# Patient Record
Sex: MALE | Race: BLACK OR AFRICAN AMERICAN | NOT HISPANIC OR LATINO | Employment: FULL TIME | URBAN - METROPOLITAN AREA
[De-identification: names, ages, dates, MRNs, and addresses within clinical notes are randomized per-mention and may not be internally consistent; named-entity substitution may affect disease eponyms.]

---

## 2017-08-09 ENCOUNTER — HOSPITAL ENCOUNTER (EMERGENCY)
Facility: HOSPITAL | Age: 32
Discharge: HOME | End: 2017-08-09
Attending: EMERGENCY MEDICINE
Payer: COMMERCIAL

## 2017-08-09 VITALS
TEMPERATURE: 98 F | HEART RATE: 58 BPM | DIASTOLIC BLOOD PRESSURE: 62 MMHG | WEIGHT: 180 LBS | HEIGHT: 72 IN | OXYGEN SATURATION: 98 % | BODY MASS INDEX: 24.38 KG/M2 | RESPIRATION RATE: 18 BRPM | SYSTOLIC BLOOD PRESSURE: 126 MMHG

## 2017-08-09 DIAGNOSIS — R10.9 ABDOMINAL PAIN: ICD-10-CM

## 2017-08-09 DIAGNOSIS — R10.9 ABDOMINAL PAIN, UNSPECIFIED LOCATION: Primary | ICD-10-CM

## 2017-08-09 DIAGNOSIS — K59.00 CONSTIPATION, UNSPECIFIED CONSTIPATION TYPE: ICD-10-CM

## 2017-08-09 PROCEDURE — 25000003 PHARM REV CODE 250: Performed by: EMERGENCY MEDICINE

## 2017-08-09 PROCEDURE — 99284 EMERGENCY DEPT VISIT MOD MDM: CPT

## 2017-08-09 RX ORDER — LACTULOSE 10 G/15ML
10 SOLUTION ORAL 2 TIMES DAILY PRN
Qty: 150 ML | Refills: 0 | Status: SHIPPED | OUTPATIENT
Start: 2017-08-09 | End: 2022-12-05

## 2017-08-09 RX ORDER — ONDANSETRON 4 MG/1
4 TABLET, FILM COATED ORAL EVERY 6 HOURS PRN
Qty: 10 TABLET | Refills: 0 | Status: SHIPPED | OUTPATIENT
Start: 2017-08-09 | End: 2020-02-14

## 2017-08-09 RX ORDER — DICYCLOMINE HYDROCHLORIDE 20 MG/1
20 TABLET ORAL 3 TIMES DAILY PRN
Qty: 14 TABLET | Refills: 0 | Status: SHIPPED | OUTPATIENT
Start: 2017-08-09 | End: 2017-09-08

## 2017-08-09 RX ADMIN — LIDOCAINE HYDROCHLORIDE: 20 SOLUTION ORAL; TOPICAL at 09:08

## 2017-08-10 NOTE — ED PROVIDER NOTES
"Encounter Date: 8/9/2017       History     Chief Complaint   Patient presents with    Abdominal Pain     present X 1.5 weeks. reports feels like constipation. reports has been taking OTC medications with no relief. reports it is "uncomfortable more than pain".      32-year-old male presents the emergency department complaining of abdominal pain.  Patient reports a generalized cramping abdominal pain that comes and goes.  Reports onset at least one or 2 weeks ago.  Reports that fluctuates in intensity.  No exacerbating or alleviating factors noted.  Patient reports it is been worsening over the last few days.  Patient reports he is also felt constipated with some occasional gas and nausea.  No vomiting.  Reports he has tried over-the-counter remedies such as stool softeners, laxatives, magnesium citrate.  He occasionally has some loose stool but feels he still has not had a good bowel movement.  No other symptoms reported.  Denies any headache, lightheadedness, dizziness, sore throat, chest pain, shorts of breath, dysuria, hematuria, fever, chills.      Review of patient's allergies indicates:  No Known Allergies  History reviewed. No pertinent past medical history.  History reviewed. No pertinent surgical history.  History reviewed. No pertinent family history.  Social History   Substance Use Topics    Smoking status: Never Smoker    Smokeless tobacco: Never Used    Alcohol use Yes      Comment: social     Review of Systems   Constitutional: Negative for chills, fatigue and fever.   HENT: Negative for congestion, sore throat and voice change.    Eyes: Negative for photophobia, pain and redness.   Respiratory: Negative for cough, choking and shortness of breath.    Cardiovascular: Negative for chest pain, palpitations and leg swelling.   Gastrointestinal: Positive for abdominal pain and nausea. Negative for vomiting.   Genitourinary: Negative for dysuria, frequency and urgency.   Musculoskeletal: Negative for " back pain, neck pain and neck stiffness.   Neurological: Negative for seizures, speech difficulty, light-headedness, numbness and headaches.   All other systems reviewed and are negative.      Physical Exam     Initial Vitals [08/09/17 2058]   BP Pulse Resp Temp SpO2   126/62 (!) 58 18 98.4 °F (36.9 °C) 98 %      MAP       83.33         Physical Exam    Nursing note and vitals reviewed.  Constitutional: He appears well-developed and well-nourished. No distress.   HENT:   Head: Normocephalic and atraumatic.   Oropharynx clear; dry mucous membranes   Eyes: Conjunctivae and EOM are normal. Pupils are equal, round, and reactive to light.   Neck: Normal range of motion. Neck supple. No tracheal deviation present.   Cardiovascular: Normal rate, regular rhythm, normal heart sounds and intact distal pulses.   Pulmonary/Chest: Breath sounds normal. No respiratory distress. He has no wheezes. He has no rhonchi. He has no rales.   Abdominal: Soft. Bowel sounds are normal. He exhibits no distension. There is tenderness (Minimal diffuse tenderness). There is no rebound and no guarding.   Musculoskeletal: Normal range of motion. He exhibits no edema or tenderness.   Neurological: He is alert and oriented to person, place, and time. He has normal strength. No cranial nerve deficit or sensory deficit.   Skin: Skin is warm and dry. Capillary refill takes less than 2 seconds.         ED Course   Procedures  Labs Reviewed - No data to display       X-Rays:   Independently Interpreted Readings:   Abdomen: XR Abd Flat & Erect interpreted by radiologist and visualized by me:      Imaging Results          X-Ray Abdomen Flat And Erect (Final result)  Result time 08/09/17 21:53:20    Final result by Lorenzo Hickman MD (08/09/17 21:53:20)                 Impression:         Nonobstructive bowel gas pattern.      Electronically signed by: LORENZO HICKMAN MD, MD  Date:     08/09/17  Time:    21:53              Narrative:    Comparison:  None.    Findings: Upright and supine views of the abdomen.  Nonspecific bowel gas pattern without evidence of bowel obstruction.  No small bowel air-fluid levels.  No free air.   No organomegaly or mass effect.  No abnormal intra-abdominal calcification seen.  Osseous structures appear intact.  Visualized lung bases are clear.                              Medical Decision Making:   Initial Assessment:   32-year-old male presents the emergency department complaining of abdominal pain, constipation  Differential Diagnosis:   Constipation, obstruction, ileus  Independently Interpreted Test(s):   I have ordered and independently interpreted X-rays - see prior notes.  ED Management:  Patient given GI cocktail.  He is tolerating by mouth with stable vital signs.  We discussed disposition including discharge with instructions on home management, instructions to increase his oral hydration, prefers for Zofran, Bentyl, lactulose, instructions to follow-up with a primary care physician, return with new or worsening symptoms.  Patient expressed good understanding and is comfortable with discharge at this time.                   ED Course     Clinical Impression:   The primary encounter diagnosis was Abdominal pain, unspecified location. Diagnoses of Abdominal pain and Constipation, unspecified constipation type were also pertinent to this visit.    Disposition:   Disposition: Discharged  Condition: Stable                        Christian Worthington MD  08/09/17 [id number]

## 2017-08-10 NOTE — ED TRIAGE NOTES
31 Y/O M with CC of abdominal discomfort x1.5 weeks. Pt states has been having a bubbling sensation. Has been having diarrhea and has been taking laxatives feeling as though has not finished. Reports blood when wiping. No other complaints verbalized. NAD noted. Will continue to monitor.

## 2019-09-07 ENCOUNTER — OFFICE VISIT (OUTPATIENT)
Dept: URGENT CARE | Facility: CLINIC | Age: 34
End: 2019-09-07

## 2019-09-07 VITALS
HEIGHT: 72 IN | DIASTOLIC BLOOD PRESSURE: 65 MMHG | OXYGEN SATURATION: 100 % | SYSTOLIC BLOOD PRESSURE: 145 MMHG | WEIGHT: 192 LBS | TEMPERATURE: 98 F | RESPIRATION RATE: 18 BRPM | BODY MASS INDEX: 26.01 KG/M2 | HEART RATE: 60 BPM

## 2019-09-07 DIAGNOSIS — K60.2 ANAL FISSURE: Primary | ICD-10-CM

## 2019-09-07 DIAGNOSIS — Z76.89 ENCOUNTER TO ESTABLISH CARE: ICD-10-CM

## 2019-09-07 DIAGNOSIS — K58.9 IRRITABLE BOWEL SYNDROME, UNSPECIFIED TYPE: ICD-10-CM

## 2019-09-07 DIAGNOSIS — F41.9 ANXIETY: ICD-10-CM

## 2019-09-07 PROCEDURE — 3008F PR BODY MASS INDEX (BMI) DOCUMENTED: ICD-10-PCS | Mod: CPTII,S$GLB,, | Performed by: PHYSICIAN ASSISTANT

## 2019-09-07 PROCEDURE — 99203 OFFICE O/P NEW LOW 30 MIN: CPT | Mod: S$GLB,,, | Performed by: PHYSICIAN ASSISTANT

## 2019-09-07 PROCEDURE — 3008F BODY MASS INDEX DOCD: CPT | Mod: CPTII,S$GLB,, | Performed by: PHYSICIAN ASSISTANT

## 2019-09-07 PROCEDURE — 99203 PR OFFICE/OUTPT VISIT, NEW, LEVL III, 30-44 MIN: ICD-10-PCS | Mod: S$GLB,,, | Performed by: PHYSICIAN ASSISTANT

## 2019-09-07 RX ORDER — HYDROXYZINE HYDROCHLORIDE 25 MG/1
25 TABLET, FILM COATED ORAL 3 TIMES DAILY
Qty: 30 TABLET | Refills: 0 | Status: SHIPPED | OUTPATIENT
Start: 2019-09-07 | End: 2022-12-05

## 2019-09-07 RX ORDER — LIDOCAINE HYDROCHLORIDE 20 MG/ML
JELLY TOPICAL 3 TIMES DAILY PRN
Qty: 30 ML | Refills: 0 | Status: SHIPPED | OUTPATIENT
Start: 2019-09-07 | End: 2020-02-14

## 2019-09-07 RX ORDER — HYDROCORTISONE 25 MG/G
CREAM TOPICAL 2 TIMES DAILY
Qty: 28 G | Refills: 0 | Status: SHIPPED | OUTPATIENT
Start: 2019-09-07 | End: 2022-12-05

## 2019-09-07 RX ORDER — DOCUSATE SODIUM 100 MG/1
100 CAPSULE, LIQUID FILLED ORAL 2 TIMES DAILY
Qty: 30 CAPSULE | Refills: 0 | Status: SHIPPED | OUTPATIENT
Start: 2019-09-07 | End: 2022-12-05

## 2019-09-07 NOTE — PATIENT INSTRUCTIONS
Diet and Lifestyle Tips for Irritable Bowel Syndrome (IBS)    Your healthcare professional may suggest some lifestyle changes to help control your IBS. Changing your diet and managing stress are two of the most important. Follow your healthcare providers instructions and try some of the suggestions below.  Change your diet  Your diet may be an important cause of IBS symptoms. You may want to try the following:  · Pay attention to what foods bother you, and avoid them. For example, dairy products are hard for some people to digest.  · Drink 6 to 8 glasses of water a day.  · Avoid caffeine and tobacco. These are muscle stimulants and can affect the working of your digestive tract.  · Avoid alcohol, which can irritate your digestive tract and make your symptoms worse.  · Eat more fiber if constipation is a problem. Fiber makes the stool softer and easier to pass through the colon.  Reduce stress  If stress or anxiety makes your IBS symptoms worse, learning how to manage stress may help you feel better. Try these tips:  · Identify the causes of stress in your life.  · Learn new ways to cope with them.  · Regular exercise is a great way to relieve stress. It can also help ease constipation.  Date Last Reviewed: 7/1/2016  © 1969-9650 [employer]. [address] [place] [address]. All rights reserved. This information is not intended as a substitute for professional medical care. Always follow your healthcare professional's instructions.        Taking a Sitz Bath  A sitz bath is a type of therapy done by sitting in warm, shallow water. It can help soothe pain, itching, and other symptoms in the anal and genital areas. It can also help keep these areas clean if you cant take a bath or shower. Sitz is from the [place] word sitzen, which means to sit.  Why a sitz bath is done  A sitz bath helps clean and treat certain problems in the anal area, genital area, and the perineum. The perineum is the area  between the anus and the vulva in women. In men, it is between the anus and the scrotum. A sitz bath helps increase blood flow to these areas and relax the muscles.  A sitz bath may be done to:  · Help ease pain and itching from hemorrhoids  · Help ease pain from an anal fissure  · Bathe and soothe the perineum after childbirth  · Clean and soothe the anal area or perineum after surgery  · Ease prostate pain during prostatitis or after a procedure  · Help ease period cramps  · Clean the anal and genital areas if you cant take a bath or shower  How a sitz bath is done  A sitz bath can be done in 2 ways: in a bathtub or using a sitz bath bowl.  In a bathtub  To take a sitz bath in a tub:  · Make sure your bathtub is clean. Fill a clean bathtub with 3 to 4 inches of warm water. In some cases, your healthcare provider may tell you to use cold water instead.  · Add salt or medicine to the water if advised by your healthcare provider.  · Gently lower yourself down into the bathtub and sit on the bottom of the tub. Dont get into the bath unless the water temperature is comfortable.  · Hold on to a railing. Or ask for help from a family member, friend, or caregiver if needed.  If you have a wound, the water may cause pain at first. But the pain should ease. Make sure the area that needs treating is under the water. You can bend your knees up to help expose the area that needs contact with the water.  Using a sitz bath bowl  A sitz bath bowl is a special plastic container thats placed on a toilet. You can buy this in many drugstores and medical supply stores. To take a sitz bath this way:  · Lift the toilet lid and seat. Place a cleaned plastic sitz bath bowl on the rim of your toilet. Make sure the bowl is firmly in place and wont move around.  · Fill the sitz bath bowl with warm water from a pitcher or other container. The water should cover your perineum. Make sure the water temperature is comfortable.  · Add salt or  medicine to the water if advised by your healthcare provider. Or follow the package instructions about how to fill the bowl. Some kits come with a plastic bag and tubing. This lets you stream water into the bowl and at the area of your body that needs treatment. The bowl may have a slot or hole in the back. This lets water flow out so it doesnt overflow onto the floor. If there is no hole, be careful not to fill the bowl too full.  · Gently sit down on the sitz bath bowl. Hold on to a railing. Or ask for help from a family member, friend, or caregiver if needed.  If you have a wound, the water may cause pain at first, but the pain should ease. Make sure the area that needs treating is under the water.  For any type of sitz bath:  1. Sit in the water for 10 to 20 minutes.  2. Add more warm water as needed to keep the water comfortable.  3. Get up slowly from the tub or toilet. You may feel lightheaded or dizzy. Hold on to a railing. Or ask for help from a family member, friend, or caregiver if needed.  4. Gently pat your anal area, perineum, and genitals dry with a clean towel. Dont rub the area.  5. Wash your hands. Put any ointment or cream on the area, if advised.  6. Wash the bathtub or sitz bath bowl with soap and water after each use.  7. Use a sitz bath 2 to 3 times a day, or as often as your healthcare provider advises.  When to call your healthcare provider  Call your healthcare provider right away if you have any of these:  · Fever of 100.4°F (38°C) or higher, or as directed  · Redness, swelling, or fluid leaking from a cut (incision) that gets worse  · Pain that gets worse  · Symptoms that dont get better, or get worse  · New symptoms   Date Last Reviewed: 5/1/2016  © 6356-3870 [employer]. [address] [place] [address], [place] [address]. All rights reserved. This information is not intended as a substitute for professional medical care. Always follow your healthcare professional's  instructions.        Anxiety Reaction  Anxiety is the feeling we all get when we think something bad might happen. It is a normal response to stress and usually causes only a mild reaction. When anxiety becomes more severe, it can interfere with daily life. In some cases, you may not even be aware of what it is youre anxious about. There may also be a genetic link or it may be a learned behavior in the home.  Both psychological and physical triggers cause stress reaction. It's often a response to fear or emotional stress, real or imagined. This stress may come from home, family, work, or social relationships.  During an anxiety reaction, you may feel:  · Helpless  · Nervous  · Depressed  · Irritable  Your body may show signs of anxiety in many ways. You may experience:  · Dry mouth  · Shakiness  · Dizziness  · Weakness  · Trouble breathing  · Breathing fast (hyperventilating)  · Chest pressure  · Sweating  · Headache  · Nausea  · Diarrhea  · Tiredness  · Inability to sleep  · Sexual problems  Home care  · Try to locate the sources of stress in your life. They may not be obvious. These may include:  ¨ Daily hassles of life (traffic jams, missed appointments, car troubles, etc.)  ¨ Major life changes, both good (new baby, job promotion) and bad (loss of job, loss of loved one)  ¨ Overload: feeling that you have too many responsibilities and can't take care of all of them at once  ¨ Feeling helpless, feeling that your problems are beyond what youre able to solve  · Notice how your body reacts to stress. Learn to listen to your body signals. This will help you take action before the stress becomes severe.  · When you can, do something about the source of your stress. (Avoid hassles, limit the amount of change that happens in your life at one time and take a break when you feel overloaded).  · Unfortunately, many stressful situations can't be avoided. It is necessary to learn how to better manage stress. There are many  proven methods that will reduce your anxiety. These include simple things like exercise, good nutrition and adequate rest. Also, there are certain techniques that are helpful:  ¨ Relaxation  ¨ Breathing exercises  ¨ Visualization  ¨ Biofeedback  ¨ Meditation  For more information about this, consult your doctor or go to a local bookstore and review the many books and tapes available on this subject.  Follow-up care  If you feel that your anxiety is not responding to self-help measures, contact your doctor or make an appointment with a counselor. You may need short-term psychological counseling and temporary medicine to help you manage stress.  Call 911  Call your healthcare provider right away if any of these occur:  · Trouble breathing  · Confusion  · Drowsiness or trouble wakening  · Fainting or loss of consciousness  · Rapid heart rate  · Seizure  · New chest pain that becomes more severe, lasts longer, or spreads into your shoulder, arm, neck, jaw, or back  When to seek medical advice  Call your healthcare provider right away if any of these occur:  · Your symptoms get worse  · Severe headache not relieved by rest and mild pain reliever  Date Last Reviewed: 9/29/2015  © 5788-6358 [employer]. [address] [place] [address]. All rights reserved. This information is not intended as a substitute for professional medical care. Always follow your healthcare professional's instructions.      Please follow up with your Primary care provider within 2-5 days if your signs and symptoms have not resolved or worsen.     If your condition worsens or fails to improve we recommend that you receive another evaluation at the emergency room immediately or contact your primary medical clinic to discuss your concerns.   You must understand that you have received an Urgent Care treatment only and that you may be released before all of your medical problems are known or treated. You, the patient, will arrange  for follow up care as instructed.     RED FLAGS/WARNING SYMPTOMS DISCUSSED WITH PATIENT THAT WOULD WARRANT EMERGENT MEDICAL ATTENTION. PATIENT VERBALIZED UNDERSTANDING.

## 2019-09-07 NOTE — PROGRESS NOTES
Subjective:       Patient ID: Susan Allison is a 34 y.o. male.    Vitals:  height is 6' (1.829 m) and weight is 87.1 kg (192 lb). His temperature is 97.7 °F (36.5 °C). His blood pressure is 145/65 (abnormal) and his pulse is 60. His respiration is 18 and oxygen saturation is 100%.     Chief Complaint: Rectal Bleeding    Rectal Bleeding   This is a new problem. The current episode started in the past 7 days (2 month ). The problem has been gradually worsening. Associated symptoms include abdominal pain. Pertinent negatives include no chest pain, chills, diaphoresis, fever, nausea or vomiting. Exacerbated by: bowel movement, passing gas. He has tried nothing for the symptoms.       Constitution: Negative for appetite change, chills, sweating and fever.   Cardiovascular: Negative for chest pain.   Respiratory: Negative for shortness of breath.    Gastrointestinal: Positive for abdominal pain, constipation, diarrhea, bright red blood in stool, rectal bleeding and rectal pain. Negative for abdominal trauma, abdominal bloating, history of abdominal surgery, nausea, vomiting, dark colored stools and heartburn.   Genitourinary: Negative for dysuria and pelvic pain.   Musculoskeletal: Negative for back pain.       Objective:      Physical Exam   Constitutional: He is oriented to person, place, and time. He appears well-developed and well-nourished.   HENT:   Head: Normocephalic and atraumatic.   Right Ear: External ear normal.   Left Ear: External ear normal.   Nose: Nose normal.   Mouth/Throat: Mucous membranes are normal.   Eyes: Conjunctivae and lids are normal.   Neck: Trachea normal and full passive range of motion without pain. Neck supple.   Cardiovascular: Normal rate, regular rhythm and normal heart sounds.   Pulmonary/Chest: Effort normal and breath sounds normal. No respiratory distress.   Abdominal: Soft. Normal appearance and bowel sounds are normal. He exhibits no distension, no abdominal bruit, no pulsatile  midline mass and no mass. There is no tenderness.   Genitourinary: Prostate normal. Rectal exam shows fissure and tenderness. Rectal exam shows no external hemorrhoid, no internal hemorrhoid, no mass and anal tone normal.   Musculoskeletal: Normal range of motion. He exhibits no edema.   Neurological: He is alert and oriented to person, place, and time. He has normal strength.   Skin: Skin is warm, dry and intact. He is not diaphoretic. No pallor.   Psychiatric: He has a normal mood and affect. His speech is normal and behavior is normal. Judgment and thought content normal. Cognition and memory are normal.   Nursing note and vitals reviewed.      Assessment:       1. Anal fissure    2. Irritable bowel syndrome, unspecified type    3. Anxiety    4. Encounter to establish care        Plan:         Anal fissure  -     Ambulatory referral to Internal Medicine  -     Ambulatory referral to Gastroenterology  -     hydrocortisone 2.5 % cream; Apply topically 2 (two) times daily.  Dispense: 28 g; Refill: 0  -     lidocaine HCL 2% (XYLOCAINE) 2 % jelly; Apply topically 3 (three) times daily as needed.  Dispense: 30 mL; Refill: 0    Irritable bowel syndrome, unspecified type  -     Ambulatory referral to Internal Medicine  -     Ambulatory referral to Gastroenterology    Anxiety  -     Ambulatory referral to Internal Medicine  -     hydrOXYzine HCl (ATARAX) 25 MG tablet; Take 1 tablet (25 mg total) by mouth 3 (three) times daily.  Dispense: 30 tablet; Refill: 0    Encounter to establish care  -     Ambulatory referral to Internal Medicine  -     Ambulatory referral to Gastroenterology    Other orders  -     docusate sodium (COLACE) 100 MG capsule; Take 1 capsule (100 mg total) by mouth 2 (two) times daily.  Dispense: 30 capsule; Refill: 0          Diet and Lifestyle Tips for Irritable Bowel Syndrome (IBS)    Your healthcare professional may suggest some lifestyle changes to help control your IBS. Changing your diet and  managing stress are two of the most important. Follow your healthcare providers instructions and try some of the suggestions below.  Change your diet  Your diet may be an important cause of IBS symptoms. You may want to try the following:  · Pay attention to what foods bother you, and avoid them. For example, dairy products are hard for some people to digest.  · Drink 6 to 8 glasses of water a day.  · Avoid caffeine and tobacco. These are muscle stimulants and can affect the working of your digestive tract.  · Avoid alcohol, which can irritate your digestive tract and make your symptoms worse.  · Eat more fiber if constipation is a problem. Fiber makes the stool softer and easier to pass through the colon.  Reduce stress  If stress or anxiety makes your IBS symptoms worse, learning how to manage stress may help you feel better. Try these tips:  · Identify the causes of stress in your life.  · Learn new ways to cope with them.  · Regular exercise is a great way to relieve stress. It can also help ease constipation.  Date Last Reviewed: 7/1/2016  © 4104-1280 [employer] [address] [place] [address]. All rights reserved. This information is not intended as a substitute for professional medical care. Always follow your healthcare professional's instructions.        Taking a Sitz Bath  A sitz bath is a type of therapy done by sitting in warm, shallow water. It can help soothe pain, itching, and other symptoms in the anal and genital areas. It can also help keep these areas clean if you cant take a bath or shower. Sitz is from the [place] word sitzen, which means to sit.  Why a sitz bath is done  A sitz bath helps clean and treat certain problems in the anal area, genital area, and the perineum. The perineum is the area between the anus and the vulva in women. In men, it is between the anus and the scrotum. A sitz bath helps increase blood flow to these areas and relax the muscles.  A sitz  bath may be done to:  · Help ease pain and itching from hemorrhoids  · Help ease pain from an anal fissure  · Bathe and soothe the perineum after childbirth  · Clean and soothe the anal area or perineum after surgery  · Ease prostate pain during prostatitis or after a procedure  · Help ease period cramps  · Clean the anal and genital areas if you cant take a bath or shower  How a sitz bath is done  A sitz bath can be done in 2 ways: in a bathtub or using a sitz bath bowl.  In a bathtub  To take a sitz bath in a tub:  · Make sure your bathtub is clean. Fill a clean bathtub with 3 to 4 inches of warm water. In some cases, your healthcare provider may tell you to use cold water instead.  · Add salt or medicine to the water if advised by your healthcare provider.  · Gently lower yourself down into the bathtub and sit on the bottom of the tub. Dont get into the bath unless the water temperature is comfortable.  · Hold on to a railing. Or ask for help from a family member, friend, or caregiver if needed.  If you have a wound, the water may cause pain at first. But the pain should ease. Make sure the area that needs treating is under the water. You can bend your knees up to help expose the area that needs contact with the water.  Using a sitz bath bowl  A sitz bath bowl is a special plastic container thats placed on a toilet. You can buy this in many drugstores and medical supply stores. To take a sitz bath this way:  · Lift the toilet lid and seat. Place a cleaned plastic sitz bath bowl on the rim of your toilet. Make sure the bowl is firmly in place and wont move around.  · Fill the sitz bath bowl with warm water from a pitcher or other container. The water should cover your perineum. Make sure the water temperature is comfortable.  · Add salt or medicine to the water if advised by your healthcare provider. Or follow the package instructions about how to fill the bowl. Some kits come with a plastic bag and tubing.  This lets you stream water into the bowl and at the area of your body that needs treatment. The bowl may have a slot or hole in the back. This lets water flow out so it doesnt overflow onto the floor. If there is no hole, be careful not to fill the bowl too full.  · Gently sit down on the sitz bath bowl. Hold on to a railing. Or ask for help from a family member, friend, or caregiver if needed.  If you have a wound, the water may cause pain at first, but the pain should ease. Make sure the area that needs treating is under the water.  For any type of sitz bath:  1. Sit in the water for 10 to 20 minutes.  2. Add more warm water as needed to keep the water comfortable.  3. Get up slowly from the tub or toilet. You may feel lightheaded or dizzy. Hold on to a railing. Or ask for help from a family member, friend, or caregiver if needed.  4. Gently pat your anal area, perineum, and genitals dry with a clean towel. Dont rub the area.  5. Wash your hands. Put any ointment or cream on the area, if advised.  6. Wash the bathtub or sitz bath bowl with soap and water after each use.  7. Use a sitz bath 2 to 3 times a day, or as often as your healthcare provider advises.  When to call your healthcare provider  Call your healthcare provider right away if you have any of these:  · Fever of 100.4°F (38°C) or higher, or as directed  · Redness, swelling, or fluid leaking from a cut (incision) that gets worse  · Pain that gets worse  · Symptoms that dont get better, or get worse  · New symptoms   Date Last Reviewed: 5/1/2016  © 4794-1941 [employer]. [address] [place] [address], [place] [address]. All rights reserved. This information is not intended as a substitute for professional medical care. Always follow your healthcare professional's instructions.        Anxiety Reaction  Anxiety is the feeling we all get when we think something bad might happen. It is a normal response to stress and usually causes only a mild  reaction. When anxiety becomes more severe, it can interfere with daily life. In some cases, you may not even be aware of what it is youre anxious about. There may also be a genetic link or it may be a learned behavior in the home.  Both psychological and physical triggers cause stress reaction. It's often a response to fear or emotional stress, real or imagined. This stress may come from home, family, work, or social relationships.  During an anxiety reaction, you may feel:  · Helpless  · Nervous  · Depressed  · Irritable  Your body may show signs of anxiety in many ways. You may experience:  · Dry mouth  · Shakiness  · Dizziness  · Weakness  · Trouble breathing  · Breathing fast (hyperventilating)  · Chest pressure  · Sweating  · Headache  · Nausea  · Diarrhea  · Tiredness  · Inability to sleep  · Sexual problems  Home care  · Try to locate the sources of stress in your life. They may not be obvious. These may include:  ¨ Daily hassles of life (traffic jams, missed appointments, car troubles, etc.)  ¨ Major life changes, both good (new baby, job promotion) and bad (loss of job, loss of loved one)  ¨ Overload: feeling that you have too many responsibilities and can't take care of all of them at once  ¨ Feeling helpless, feeling that your problems are beyond what youre able to solve  · Notice how your body reacts to stress. Learn to listen to your body signals. This will help you take action before the stress becomes severe.  · When you can, do something about the source of your stress. (Avoid hassles, limit the amount of change that happens in your life at one time and take a break when you feel overloaded).  · Unfortunately, many stressful situations can't be avoided. It is necessary to learn how to better manage stress. There are many proven methods that will reduce your anxiety. These include simple things like exercise, good nutrition and adequate rest. Also, there are certain techniques that are  helpful:  ¨ Relaxation  ¨ Breathing exercises  ¨ Visualization  ¨ Biofeedback  ¨ Meditation  For more information about this, consult your doctor or go to a local bookstore and review the many books and tapes available on this subject.  Follow-up care  If you feel that your anxiety is not responding to self-help measures, contact your doctor or make an appointment with a counselor. You may need short-term psychological counseling and temporary medicine to help you manage stress.  Call 911  Call your healthcare provider right away if any of these occur:  · Trouble breathing  · Confusion  · Drowsiness or trouble wakening  · Fainting or loss of consciousness  · Rapid heart rate  · Seizure  · New chest pain that becomes more severe, lasts longer, or spreads into your shoulder, arm, neck, jaw, or back  When to seek medical advice  Call your healthcare provider right away if any of these occur:  · Your symptoms get worse  · Severe headache not relieved by rest and mild pain reliever  Date Last Reviewed: 9/29/2015  © 0056-1523 [employer]. [address] [place] [address], [place] [address]. All rights reserved. This information is not intended as a substitute for professional medical care. Always follow your healthcare professional's instructions.      Please follow up with your Primary care provider within 2-5 days if your signs and symptoms have not resolved or worsen.     If your condition worsens or fails to improve we recommend that you receive another evaluation at the emergency room immediately or contact your primary medical clinic to discuss your concerns.   You must understand that you have received an Urgent Care treatment only and that you may be released before all of your medical problems are known or treated. You, the patient, will arrange for follow up care as instructed.     RED FLAGS/WARNING SYMPTOMS DISCUSSED WITH PATIENT THAT WOULD WARRANT EMERGENT MEDICAL ATTENTION. PATIENT VERBALIZED  UNDERSTANDING.

## 2020-02-11 NOTE — PROGRESS NOTES
Subjective:       Patient ID: Susan Allison is a 34 y.o. male.    Chief Complaint:   Establish Care      HPI    #Last visit/Previous Provider  This patient is new to me  Previously seen by no one   Last visit was >1 year  Last CPE was >1 ear      #Interim Hx    No urgent care or ED/hospitalization    #Concerns Today    Rectal pain   Onset; early last year  Location; anal pain  Duration; a 3x/week   Scale; 8/10   Description; sharp, throbbing   Alleviation; lidocaine cream , Aquaphor   Associate; streak of blood when wiping. Usually daily, no straining, 3-5x day, solid to loose,   Denies;   - fevers, sweats, nighttime , weight loss, no rashes or joint pain, eye pain   - nausea, vomiting, melena, hematochezia   - no anal sexual activity  Previous epidsoes; 2019   Works in the [employer]       #Chronic Problems    none        Health Maintenance   Topic Date Due    Lipid Panel  1985    TETANUS VACCINE  06/28/2003         Review of Systems   Constitutional: Negative for activity change, appetite change, fatigue and fever.   Respiratory: Negative for shortness of breath.    Cardiovascular: Negative for chest pain.   Gastrointestinal: Positive for anal bleeding. Negative for abdominal pain.        Anal pain   Genitourinary: Negative for difficulty urinating.   Neurological: Negative for syncope and headaches.       Objective:   /68 (BP Location: Right arm, Patient Position: Sitting, BP Method: Medium (Manual))   Pulse 60   Ht 6' (1.829 m)   Wt 89 kg (196 lb 3.4 oz)   SpO2 100%   BMI 26.61 kg/m²        Physical Exam   Constitutional: He appears well-developed and well-nourished. No distress.   HENT:   Head: Normocephalic.   Mouth/Throat: Oropharynx is clear and moist. No oropharyngeal exudate.   Eyes: Pupils are equal, round, and reactive to light. Conjunctivae are normal. Right eye exhibits no discharge. Left eye exhibits no discharge.   Cardiovascular: Normal rate, regular rhythm and normal heart sounds.  Exam reveals no gallop and no friction rub.   No murmur heard.  Pulmonary/Chest: Effort normal. No respiratory distress.   Abdominal: Soft. He exhibits no distension.   Genitourinary:   Genitourinary Comments: Severe anal erythema, no obvious anal fissure  No external hemrroid     Neurological: He is alert.   Skin: Skin is warm. He is not diaphoretic.   Psychiatric: He has a normal mood and affect.   Nursing note and vitals reviewed.          Assessment:       1. Preventative health care    2. Anal pain    3. Screening for diabetes mellitus    4. Screening for venereal disease    5. Need for vaccination    6. Screening for hyperlipidemia    7. Screening for cervical cancer    8. Anal fissure          New: stable  New: Uncontrolled  New : worsening  New: Improved    Chronic stable  Chonic : uncontrolled  Chronic : worsening  Chronic : improved    Plan:             Susan was seen today for [facility] care.    Diagnoses and all orders for this visit:    Preventative health care  -     Comprehensive metabolic panel; Future  -     CBC auto differential; Future  -     TSH; Future    Anal pain  Signs, symptoms consistent with anal fissure  Nothing on history or pyhsical exam to suggest hemrroid, cellulitis or maligancy  I provided instruction on supportive care measures   Sent anusol to pharmacy  If symptoms persistent will consider further w/u    reviewed signs and symptoms that should prompt return to provider or evaluation in the ED  -     hydrocortisone (ANUSOL-HC) 25 mg suppository; Place 1 suppository (25 mg total) rectally 2 (two) times daily. for 10 days      Screening for diabetes mellitus  -     Hemoglobin A1c; Future    Screening for venereal disease  -     C. trachomatis/N. gonorrhoeae by AMP DNA [facility]; Urine; Future  -     HIV 1/2 Ag/Ab (4th Gen); Future  -     RPR; Future    Need for vaccination  -- I reviewed the patient vaccine history and provided the risk benefits and side effects of the vaccine.   --  I provided the patient a VIS sheet on the vaccine.   -     Tdap Vaccine  -     Pneumococcal Polysaccharide Vaccine (23 Valent) (SQ/IM)  -     Influenza - Quadrivalent (PF)    Screening for hyperlipidemia  -     Lipid panel; Future    Screening for cervical cancer    Anal fissure

## 2020-02-14 ENCOUNTER — LAB VISIT (OUTPATIENT)
Dept: LAB | Facility: HOSPITAL | Age: 35
End: 2020-02-14
Attending: INTERNAL MEDICINE
Payer: COMMERCIAL

## 2020-02-14 ENCOUNTER — OFFICE VISIT (OUTPATIENT)
Dept: INTERNAL MEDICINE | Facility: CLINIC | Age: 35
End: 2020-02-14
Payer: COMMERCIAL

## 2020-02-14 VITALS
HEIGHT: 72 IN | HEART RATE: 60 BPM | SYSTOLIC BLOOD PRESSURE: 110 MMHG | DIASTOLIC BLOOD PRESSURE: 68 MMHG | OXYGEN SATURATION: 100 % | BODY MASS INDEX: 26.57 KG/M2 | WEIGHT: 196.19 LBS

## 2020-02-14 DIAGNOSIS — Z13.1 SCREENING FOR DIABETES MELLITUS: ICD-10-CM

## 2020-02-14 DIAGNOSIS — Z13.220 SCREENING FOR HYPERLIPIDEMIA: ICD-10-CM

## 2020-02-14 DIAGNOSIS — Z00.00 PREVENTATIVE HEALTH CARE: ICD-10-CM

## 2020-02-14 DIAGNOSIS — Z00.00 PREVENTATIVE HEALTH CARE: Primary | ICD-10-CM

## 2020-02-14 DIAGNOSIS — K60.2 ANAL FISSURE: ICD-10-CM

## 2020-02-14 DIAGNOSIS — Z23 NEED FOR VACCINATION: ICD-10-CM

## 2020-02-14 DIAGNOSIS — Z11.3 SCREENING FOR VENEREAL DISEASE: ICD-10-CM

## 2020-02-14 DIAGNOSIS — K62.89 ANAL PAIN: ICD-10-CM

## 2020-02-14 LAB
ALBUMIN SERPL BCP-MCNC: 4.1 G/DL (ref 3.5–5.2)
ALP SERPL-CCNC: 63 U/L (ref 55–135)
ALT SERPL W/O P-5'-P-CCNC: 24 U/L (ref 10–44)
ANION GAP SERPL CALC-SCNC: 10 MMOL/L (ref 8–16)
AST SERPL-CCNC: 29 U/L (ref 10–40)
BASOPHILS # BLD AUTO: 0.03 K/UL (ref 0–0.2)
BASOPHILS NFR BLD: 0.5 % (ref 0–1.9)
BILIRUB SERPL-MCNC: 1.7 MG/DL (ref 0.1–1)
BUN SERPL-MCNC: 12 MG/DL (ref 6–20)
CALCIUM SERPL-MCNC: 9.4 MG/DL (ref 8.7–10.5)
CHLORIDE SERPL-SCNC: 105 MMOL/L (ref 95–110)
CHOLEST SERPL-MCNC: 216 MG/DL (ref 120–199)
CHOLEST/HDLC SERPL: 5.8 {RATIO} (ref 2–5)
CO2 SERPL-SCNC: 25 MMOL/L (ref 23–29)
CREAT SERPL-MCNC: 1 MG/DL (ref 0.5–1.4)
DIFFERENTIAL METHOD: ABNORMAL
EOSINOPHIL # BLD AUTO: 0.2 K/UL (ref 0–0.5)
EOSINOPHIL NFR BLD: 3 % (ref 0–8)
ERYTHROCYTE [DISTWIDTH] IN BLOOD BY AUTOMATED COUNT: 12.5 % (ref 11.5–14.5)
EST. GFR  (AFRICAN AMERICAN): >60 ML/MIN/1.73 M^2
EST. GFR  (NON AFRICAN AMERICAN): >60 ML/MIN/1.73 M^2
ESTIMATED AVG GLUCOSE: 126 MG/DL (ref 68–131)
GLUCOSE SERPL-MCNC: 79 MG/DL (ref 70–110)
HBA1C MFR BLD HPLC: 6 % (ref 4–5.6)
HCT VFR BLD AUTO: 44.6 % (ref 40–54)
HDLC SERPL-MCNC: 37 MG/DL (ref 40–75)
HDLC SERPL: 17.1 % (ref 20–50)
HGB BLD-MCNC: 14.5 G/DL (ref 14–18)
IMM GRANULOCYTES # BLD AUTO: 0 K/UL (ref 0–0.04)
IMM GRANULOCYTES NFR BLD AUTO: 0 % (ref 0–0.5)
LDLC SERPL CALC-MCNC: 138.4 MG/DL (ref 63–159)
LYMPHOCYTES # BLD AUTO: 2.2 K/UL (ref 1–4.8)
LYMPHOCYTES NFR BLD: 37.7 % (ref 18–48)
MCH RBC QN AUTO: 30.3 PG (ref 27–31)
MCHC RBC AUTO-ENTMCNC: 32.5 G/DL (ref 32–36)
MCV RBC AUTO: 93 FL (ref 82–98)
MONOCYTES # BLD AUTO: 0.5 K/UL (ref 0.3–1)
MONOCYTES NFR BLD: 8.3 % (ref 4–15)
NEUTROPHILS # BLD AUTO: 3 K/UL (ref 1.8–7.7)
NEUTROPHILS NFR BLD: 50.5 % (ref 38–73)
NONHDLC SERPL-MCNC: 179 MG/DL
NRBC BLD-RTO: 0 /100 WBC
PLATELET # BLD AUTO: 126 K/UL (ref 150–350)
PMV BLD AUTO: 10.4 FL (ref 9.2–12.9)
POTASSIUM SERPL-SCNC: 3.8 MMOL/L (ref 3.5–5.1)
PROT SERPL-MCNC: 8 G/DL (ref 6–8.4)
RBC # BLD AUTO: 4.78 M/UL (ref 4.6–6.2)
SODIUM SERPL-SCNC: 140 MMOL/L (ref 136–145)
TRIGL SERPL-MCNC: 203 MG/DL (ref 30–150)
TSH SERPL DL<=0.005 MIU/L-ACNC: 0.87 UIU/ML (ref 0.4–4)
WBC # BLD AUTO: 5.92 K/UL (ref 3.9–12.7)

## 2020-02-14 PROCEDURE — 99395 PR PREVENTIVE VISIT,EST,18-39: ICD-10-PCS | Mod: 25,S$GLB,, | Performed by: INTERNAL MEDICINE

## 2020-02-14 PROCEDURE — 90472 IMMUNIZATION ADMIN EACH ADD: CPT | Mod: S$GLB,,, | Performed by: INTERNAL MEDICINE

## 2020-02-14 PROCEDURE — 90715 TDAP VACCINE 7 YRS/> IM: CPT | Mod: S$GLB,,, | Performed by: INTERNAL MEDICINE

## 2020-02-14 PROCEDURE — 90715 TDAP VACCINE GREATER THAN OR EQUAL TO 7YO IM: ICD-10-PCS | Mod: S$GLB,,, | Performed by: INTERNAL MEDICINE

## 2020-02-14 PROCEDURE — 99395 PREV VISIT EST AGE 18-39: CPT | Mod: 25,S$GLB,, | Performed by: INTERNAL MEDICINE

## 2020-02-14 PROCEDURE — 84443 ASSAY THYROID STIM HORMONE: CPT

## 2020-02-14 PROCEDURE — 90732 PPSV23 VACC 2 YRS+ SUBQ/IM: CPT | Mod: S$GLB,,, | Performed by: INTERNAL MEDICINE

## 2020-02-14 PROCEDURE — 85025 COMPLETE CBC W/AUTO DIFF WBC: CPT

## 2020-02-14 PROCEDURE — 90732 PNEUMOCOCCAL POLYSACCHARIDE VACCINE 23-VALENT =>2YO SQ IM: ICD-10-PCS | Mod: S$GLB,,, | Performed by: INTERNAL MEDICINE

## 2020-02-14 PROCEDURE — 99999 PR PBB SHADOW E&M-EST. PATIENT-LVL IV: CPT | Mod: PBBFAC,,, | Performed by: INTERNAL MEDICINE

## 2020-02-14 PROCEDURE — 80061 LIPID PANEL: CPT

## 2020-02-14 PROCEDURE — 90472 TDAP VACCINE GREATER THAN OR EQUAL TO 7YO IM: ICD-10-PCS | Mod: S$GLB,,, | Performed by: INTERNAL MEDICINE

## 2020-02-14 PROCEDURE — 90686 IIV4 VACC NO PRSV 0.5 ML IM: CPT | Mod: S$GLB,,, | Performed by: INTERNAL MEDICINE

## 2020-02-14 PROCEDURE — 90471 FLU VACCINE (QUAD) GREATER THAN OR EQUAL TO 3YO PRESERVATIVE FREE IM: ICD-10-PCS | Mod: S$GLB,,, | Performed by: INTERNAL MEDICINE

## 2020-02-14 PROCEDURE — 36415 COLL VENOUS BLD VENIPUNCTURE: CPT | Mod: PO

## 2020-02-14 PROCEDURE — 86592 SYPHILIS TEST NON-TREP QUAL: CPT

## 2020-02-14 PROCEDURE — 90471 IMMUNIZATION ADMIN: CPT | Mod: S$GLB,,, | Performed by: INTERNAL MEDICINE

## 2020-02-14 PROCEDURE — 99999 PR PBB SHADOW E&M-EST. PATIENT-LVL IV: ICD-10-PCS | Mod: PBBFAC,,, | Performed by: INTERNAL MEDICINE

## 2020-02-14 PROCEDURE — 90686 FLU VACCINE (QUAD) GREATER THAN OR EQUAL TO 3YO PRESERVATIVE FREE IM: ICD-10-PCS | Mod: S$GLB,,, | Performed by: INTERNAL MEDICINE

## 2020-02-14 PROCEDURE — 86703 HIV-1/HIV-2 1 RESULT ANTBDY: CPT

## 2020-02-14 PROCEDURE — 83036 HEMOGLOBIN GLYCOSYLATED A1C: CPT

## 2020-02-14 PROCEDURE — 80053 COMPREHEN METABOLIC PANEL: CPT

## 2020-02-14 RX ORDER — HYDROCORTISONE ACETATE 25 MG/1
25 SUPPOSITORY RECTAL 2 TIMES DAILY
Qty: 20 SUPPOSITORY | Refills: 0 | Status: SHIPPED | OUTPATIENT
Start: 2020-02-14 | End: 2020-02-24

## 2020-02-14 NOTE — PATIENT INSTRUCTIONS
We were happy to see you today    For your Testing  Please have your labs and imaging test done at your earliest convience    1. Labs, urine testing     For your Medication   For more information about side effects please visit medlineplus.gov      For your Vaccinations    We gave your the following vaccines  Influenza  Tetanus   Please obtain the following vaccines at the pharmacy          Please return to clinic in    In 4 weeks if symptoms do not improve

## 2020-02-15 LAB — RPR SER QL: NORMAL

## 2020-02-17 ENCOUNTER — TELEPHONE (OUTPATIENT)
Dept: INTERNAL MEDICINE | Facility: CLINIC | Age: 35
End: 2020-02-17

## 2020-02-17 ENCOUNTER — PATIENT MESSAGE (OUTPATIENT)
Dept: INTERNAL MEDICINE | Facility: CLINIC | Age: 35
End: 2020-02-17

## 2020-02-17 DIAGNOSIS — D69.6 THROMBOCYTOPENIC: ICD-10-CM

## 2020-02-17 DIAGNOSIS — R17 ELEVATED BILIRUBIN: Primary | ICD-10-CM

## 2020-02-17 PROBLEM — R73.03 PREDIABETES: Status: ACTIVE | Noted: 2020-02-17

## 2020-02-17 PROBLEM — E78.5 HLD (HYPERLIPIDEMIA): Status: ACTIVE | Noted: 2020-02-17

## 2020-02-17 LAB — HIV 1+2 AB+HIV1 P24 AG SERPL QL IA: NEGATIVE

## 2020-02-17 NOTE — TELEPHONE ENCOUNTER
----- Message from Walter Mak III, MD sent at 2/17/2020 12:07 PM CST -----  Attemtped to call patient    thrombocytopenia  Elevated TC  Elevated bili  Prediabetes  Will repeat at 4 weeks follow up visit   ______________________________  Eli can you call to ritu  Repeat labs and f/u in 4 weeks    ______________________________________________________________________________________    Greetings    The results of your lab work showed elevated cholesterol and slightly elevated hemoglobin a1c, the testing for diabetes, putting you in the prediabetic range. Otherwise the results were all normal .     To reverse this process recommend for diet and exercise to include 4-5 servings of fruits and vegetables per day, avoidance of fast food, fried food, sweetend beverages and to exercise 150min/week.     We will need to repeat some of your lab test prior to your follow up in one month

## 2020-02-17 NOTE — PROGRESS NOTES
Attemtped to call patient    thrombocytopenia  Elevated TC  Elevated bili  Prediabetes  Will repeat at 4 weeks follow up visit   ______________________________  Eli can you call to ritu  Repeat labs and f/u in 4 weeks    ______________________________________________________________________________________    Greetings    The results of your lab work showed elevated cholesterol and slightly elevated hemoglobin a1c, the testing for diabetes, putting you in the prediabetic range. Otherwise the results were all normal .     To reverse this process recommend for diet and exercise to include 4-5 servings of fruits and vegetables per day, avoidance of fast food, fried food, sweetend beverages and to exercise 150min/week.     We will need to repeat some of your lab test prior to your follow up in one month

## 2020-03-17 ENCOUNTER — OFFICE VISIT (OUTPATIENT)
Dept: URGENT CARE | Facility: CLINIC | Age: 35
End: 2020-03-17
Payer: COMMERCIAL

## 2020-03-17 VITALS
RESPIRATION RATE: 16 BRPM | BODY MASS INDEX: 25.84 KG/M2 | HEIGHT: 73 IN | DIASTOLIC BLOOD PRESSURE: 78 MMHG | TEMPERATURE: 99 F | HEART RATE: 68 BPM | SYSTOLIC BLOOD PRESSURE: 124 MMHG | WEIGHT: 195 LBS | OXYGEN SATURATION: 98 %

## 2020-03-17 DIAGNOSIS — R11.0 NAUSEA: Primary | ICD-10-CM

## 2020-03-17 PROCEDURE — 99213 PR OFFICE/OUTPT VISIT, EST, LEVL III, 20-29 MIN: ICD-10-PCS | Mod: S$GLB,,, | Performed by: FAMILY MEDICINE

## 2020-03-17 PROCEDURE — 99213 OFFICE O/P EST LOW 20 MIN: CPT | Mod: S$GLB,,, | Performed by: FAMILY MEDICINE

## 2020-03-17 NOTE — PROGRESS NOTES
"Subjective:       Patient ID: Susan Allison is a 34 y.o. male.    Vitals:  height is 6' 1" (1.854 m) and weight is 88.5 kg (195 lb). His oral temperature is 98.8 °F (37.1 °C). His blood pressure is 124/78 and his pulse is 68. His respiration is 16 and oxygen saturation is 98%.     Chief Complaint: Nausea    34-year-old male with complaint of had nausea yesterday and no vomiting no diarrhea admits some unusual food the day prior and took some Zofran left over with a resolving of the symptoms but since he called and told of a flow year they wanted him to get a clearance    Nausea   This is a new problem. The current episode started yesterday (yesterday). The problem occurs intermittently. The problem has been resolved. Associated symptoms include nausea. Pertinent negatives include no arthralgias, chest pain, chills, congestion, coughing, fatigue, fever, headaches, joint swelling, myalgias, rash, sore throat, vertigo or vomiting. Nothing aggravates the symptoms. He has tried nothing for the symptoms.       Constitution: Negative for chills, fatigue and fever.   HENT: Negative for congestion and sore throat.    Neck: Negative for painful lymph nodes.   Cardiovascular: Negative for chest pain and leg swelling.   Eyes: Negative for double vision and blurred vision.   Respiratory: Negative for cough and shortness of breath.    Gastrointestinal: Positive for nausea. Negative for vomiting and diarrhea.   Genitourinary: Negative for dysuria, frequency and urgency.   Musculoskeletal: Negative for joint pain, joint swelling, muscle cramps and muscle ache.   Skin: Negative for color change, pale and rash.   Allergic/Immunologic: Negative for seasonal allergies.   Neurological: Negative for dizziness, history of vertigo, light-headedness, passing out and headaches.   Hematologic/Lymphatic: Negative for swollen lymph nodes, easy bruising/bleeding and history of blood clots. Does not bruise/bleed easily.   Psychiatric/Behavioral: " Negative for nervous/anxious, sleep disturbance and depression. The patient is not nervous/anxious.        Objective:      Physical Exam   Constitutional: He is oriented to person, place, and time. He appears well-developed and well-nourished. He is cooperative.  Non-toxic appearance. He does not appear ill. No distress.   Non ill-appearing male in no apparent distress   HENT:   Head: Normocephalic and atraumatic.   Right Ear: Hearing, tympanic membrane, external ear and ear canal normal.   Left Ear: Hearing, tympanic membrane, external ear and ear canal normal.   Nose: Nose normal. No mucosal edema, rhinorrhea or nasal deformity. No epistaxis. Right sinus exhibits no maxillary sinus tenderness and no frontal sinus tenderness. Left sinus exhibits no maxillary sinus tenderness and no frontal sinus tenderness.   Mouth/Throat: Uvula is midline, oropharynx is clear and moist and mucous membranes are normal. No trismus in the jaw. Normal dentition. No uvula swelling. No oropharyngeal exudate or posterior oropharyngeal erythema.   Eyes: Conjunctivae and lids are normal. Right eye exhibits no discharge. Left eye exhibits no discharge. No scleral icterus.   Neck: Trachea normal, normal range of motion, full passive range of motion without pain and phonation normal. Neck supple. No JVD present. No tracheal deviation present.   Cardiovascular: Normal rate, regular rhythm, normal heart sounds, intact distal pulses and normal pulses. Exam reveals no friction rub.   No murmur heard.  Pulmonary/Chest: Effort normal and breath sounds normal. No stridor. No respiratory distress. He has no rales.   Abdominal: Soft. Normal appearance and bowel sounds are normal. He exhibits no distension, no pulsatile midline mass and no mass. There is no tenderness.   Musculoskeletal: Normal range of motion. He exhibits no edema or deformity.   Lymphadenopathy:     He has no cervical adenopathy.   Neurological: He is alert and oriented to person,  place, and time. He exhibits normal muscle tone. Coordination normal.   Skin: Skin is warm, dry, intact, not diaphoretic and not pale.   Psychiatric: He has a normal mood and affect. His speech is normal and behavior is normal. Judgment and thought content normal. Cognition and memory are normal.   Nursing note and vitals reviewed.        Assessment:       1. Nausea        Plan:         Nausea     Nausea resolved patient advised to take Zofran as needed if nausea or vomiting persist otherwise he since he looks and feels normal was released to go back to work tomorrow

## 2020-03-17 NOTE — LETTER
March 17, 2020      [facility]  [place]  [address] [place] [address]  Phone: [phone]  Fax: [phone]       Patient: Susan Allison   YOB: 1985  Date of Visit: 03/17/2020    To Whom It May Concern:    Omayra Allison  was at [facility] on 03/17/2020. He may return to work/school on 3/18/20  with no restrictions. If you have any questions or concerns, or if I can be of further assistance, please do not hesitate to contact me.    Sincerely,    Paul Gillespie MD

## 2021-04-05 ENCOUNTER — PATIENT MESSAGE (OUTPATIENT)
Dept: ADMINISTRATIVE | Facility: HOSPITAL | Age: 36
End: 2021-04-05

## 2021-04-16 ENCOUNTER — PATIENT MESSAGE (OUTPATIENT)
Dept: RESEARCH | Facility: HOSPITAL | Age: 36
End: 2021-04-16

## 2021-05-13 ENCOUNTER — OCCUPATIONAL HEALTH (OUTPATIENT)
Dept: URGENT CARE | Facility: CLINIC | Age: 36
End: 2021-05-13

## 2021-05-13 DIAGNOSIS — Z02.83 ENCOUNTER FOR DRUG SCREENING: Primary | ICD-10-CM

## 2021-05-13 PROCEDURE — 80305 DRUG TEST PRSMV DIR OPT OBS: CPT | Mod: S$GLB,,, | Performed by: EMERGENCY MEDICINE

## 2021-05-13 PROCEDURE — 80305 OOH COLLECTION ONLY DRUG SCREEN: ICD-10-PCS | Mod: S$GLB,,, | Performed by: EMERGENCY MEDICINE

## 2021-07-06 ENCOUNTER — PATIENT MESSAGE (OUTPATIENT)
Dept: ADMINISTRATIVE | Facility: HOSPITAL | Age: 36
End: 2021-07-06

## 2021-10-04 ENCOUNTER — PATIENT MESSAGE (OUTPATIENT)
Dept: ADMINISTRATIVE | Facility: HOSPITAL | Age: 36
End: 2021-10-04

## 2022-01-10 ENCOUNTER — PATIENT MESSAGE (OUTPATIENT)
Dept: ADMINISTRATIVE | Facility: HOSPITAL | Age: 37
End: 2022-01-10
Payer: COMMERCIAL

## 2022-01-27 ENCOUNTER — OFFICE VISIT (OUTPATIENT)
Dept: URGENT CARE | Facility: CLINIC | Age: 37
End: 2022-01-27
Payer: COMMERCIAL

## 2022-01-27 VITALS
TEMPERATURE: 99 F | HEIGHT: 73 IN | WEIGHT: 190 LBS | RESPIRATION RATE: 20 BRPM | BODY MASS INDEX: 25.18 KG/M2 | DIASTOLIC BLOOD PRESSURE: 82 MMHG | OXYGEN SATURATION: 96 % | HEART RATE: 79 BPM | SYSTOLIC BLOOD PRESSURE: 129 MMHG

## 2022-01-27 DIAGNOSIS — R05.9 COUGH: Primary | ICD-10-CM

## 2022-01-27 DIAGNOSIS — B34.9 VIRAL SYNDROME: ICD-10-CM

## 2022-01-27 LAB
CTP QC/QA: YES
SARS-COV-2 RDRP RESP QL NAA+PROBE: NEGATIVE

## 2022-01-27 PROCEDURE — 1159F PR MEDICATION LIST DOCUMENTED IN MEDICAL RECORD: ICD-10-PCS | Mod: CPTII,S$GLB,, | Performed by: PHYSICIAN ASSISTANT

## 2022-01-27 PROCEDURE — 3074F PR MOST RECENT SYSTOLIC BLOOD PRESSURE < 130 MM HG: ICD-10-PCS | Mod: CPTII,S$GLB,, | Performed by: PHYSICIAN ASSISTANT

## 2022-01-27 PROCEDURE — 3008F BODY MASS INDEX DOCD: CPT | Mod: CPTII,S$GLB,, | Performed by: PHYSICIAN ASSISTANT

## 2022-01-27 PROCEDURE — 99214 PR OFFICE/OUTPT VISIT, EST, LEVL IV, 30-39 MIN: ICD-10-PCS | Mod: S$GLB,,, | Performed by: PHYSICIAN ASSISTANT

## 2022-01-27 PROCEDURE — 99214 OFFICE O/P EST MOD 30 MIN: CPT | Mod: S$GLB,,, | Performed by: PHYSICIAN ASSISTANT

## 2022-01-27 PROCEDURE — 1160F PR REVIEW ALL MEDS BY PRESCRIBER/CLIN PHARMACIST DOCUMENTED: ICD-10-PCS | Mod: CPTII,S$GLB,, | Performed by: PHYSICIAN ASSISTANT

## 2022-01-27 PROCEDURE — 3074F SYST BP LT 130 MM HG: CPT | Mod: CPTII,S$GLB,, | Performed by: PHYSICIAN ASSISTANT

## 2022-01-27 PROCEDURE — 3079F PR MOST RECENT DIASTOLIC BLOOD PRESSURE 80-89 MM HG: ICD-10-PCS | Mod: CPTII,S$GLB,, | Performed by: PHYSICIAN ASSISTANT

## 2022-01-27 PROCEDURE — U0002: ICD-10-PCS | Mod: QW,S$GLB,, | Performed by: PHYSICIAN ASSISTANT

## 2022-01-27 PROCEDURE — 3008F PR BODY MASS INDEX (BMI) DOCUMENTED: ICD-10-PCS | Mod: CPTII,S$GLB,, | Performed by: PHYSICIAN ASSISTANT

## 2022-01-27 PROCEDURE — 1160F RVW MEDS BY RX/DR IN RCRD: CPT | Mod: CPTII,S$GLB,, | Performed by: PHYSICIAN ASSISTANT

## 2022-01-27 PROCEDURE — 3079F DIAST BP 80-89 MM HG: CPT | Mod: CPTII,S$GLB,, | Performed by: PHYSICIAN ASSISTANT

## 2022-01-27 PROCEDURE — U0002 COVID-19 LAB TEST NON-CDC: HCPCS | Mod: QW,S$GLB,, | Performed by: PHYSICIAN ASSISTANT

## 2022-01-27 PROCEDURE — 1159F MED LIST DOCD IN RCRD: CPT | Mod: CPTII,S$GLB,, | Performed by: PHYSICIAN ASSISTANT

## 2022-01-27 NOTE — PROGRESS NOTES
"Subjective:       Patient ID: Susan Allison is a 36 y.o. male.    Vitals:  height is 6' 1" (1.854 m) and weight is 86.2 kg (190 lb). His oral temperature is 98.6 °F (37 °C). His blood pressure is 129/82 and his pulse is 79. His respiration is 20 and oxygen saturation is 96%.     Chief Complaint: Nasal Congestion (Fatigue, diarrhea, cough)    Pt stated that his symptoms began on yesterday and he would liked to be tested for covid in today visited.No Other c/o.  He complains of runny nose with no fever    Diarrhea   This is a new problem. The current episode started yesterday. The problem has been unchanged. The stool consistency is described as watery. The patient states that diarrhea does not awaken him from sleep. Associated symptoms include coughing. Nothing aggravates the symptoms. There are no known risk factors. He has tried nothing for the symptoms. The treatment provided no relief.       Respiratory: Positive for cough.    Gastrointestinal: Negative for diarrhea.       Objective:      Physical Exam   Constitutional: He is oriented to person, place, and time. He appears well-developed and well-nourished. He is cooperative.  Non-toxic appearance. He does not have a sickly appearance. He does not appear ill. No distress.   HENT:   Head: Normocephalic and atraumatic.   Ears:   Right Ear: Hearing, tympanic membrane, external ear and ear canal normal.   Left Ear: Hearing, tympanic membrane, external ear and ear canal normal.   Nose: Nose normal. No mucosal edema, rhinorrhea or nasal deformity. No epistaxis. Right sinus exhibits no maxillary sinus tenderness and no frontal sinus tenderness. Left sinus exhibits no maxillary sinus tenderness and no frontal sinus tenderness.   Mouth/Throat: Uvula is midline and oropharynx is clear and moist. Mucous membranes are dry. No trismus in the jaw. Normal dentition. No uvula swelling. No oropharyngeal exudate, posterior oropharyngeal edema or posterior oropharyngeal erythema. "   Eyes: Conjunctivae and lids are normal. No scleral icterus.   Neck: Trachea normal and phonation normal. Neck supple. No edema present. No erythema present. No neck rigidity present.   Cardiovascular: Normal rate, regular rhythm, normal heart sounds, intact distal pulses and normal pulses.   Pulmonary/Chest: Effort normal and breath sounds normal. No respiratory distress. He has no decreased breath sounds. He has no wheezes. He has no rhonchi.   Abdominal: Normal appearance.   Musculoskeletal: Normal range of motion.         General: No deformity or edema. Normal range of motion.   Neurological: no focal deficit. He is alert and oriented to person, place, and time. He exhibits normal muscle tone. Coordination normal.   Skin: Skin is warm, dry, intact, not diaphoretic and not pale. Capillary refill takes less than 2 seconds.   Psychiatric: He has a normal mood and affect. His speech is normal and behavior is normal. Judgment and thought content normal. Cognition and memory  Nursing note and vitals reviewed.    Results for orders placed or performed in visit on 01/27/22   POCT COVID-19 Rapid Screening   Result Value Ref Range    POC Rapid COVID Negative Negative     Acceptable Yes     No results found.       Assessment:       1. Cough    2. Viral syndrome          Plan:         Cough  -     POCT COVID-19 Rapid Screening    Viral syndrome     Follow up if symptoms worsen or fail to improve, for F/U with PCP or ED.   Patient Instructions   Patient Education       Diarrhea, Adult ED   General Information   You came to the Emergency Department (ED) for diarrhea. Most doctors say you have diarrhea if you have 3 or more runny or watery stools or bowel movements in a day. The doctors feel that the risk of a serious cause for your diarrhea is low.  Diarrhea that starts all of a sudden is most often caused by a virus or bacteria. This will likely get better on its own after a few days. Other things can cause  you to have loose stools like:  · Side effects from the medicines you take.  · Problems digesting your food.  · Problems with your digestive system.  You may be waiting on some test results. The staff will contact you if there are concerning results.  What care is needed at home?   · Call your regular doctor to let them know you were in the ED. Make a follow-up appointment if you were told to.  · Drink small amounts of fluid every 15 to 30 minutes. Good fluids to drink are water, broth, and oral electrolyte solutions. Sugar-free or very low sugar sports drinks are also OK.  · Try to eat a small amount of food. Good foods to eat are potatoes, noodles, rice, oatmeal, crackers, soup, soft vegetables, and bananas. Avoid fatty foods and dairy products.  · Wash your hands often. This will help keep others healthy. It is easy to spread diarrhea from one person to the next.  · Stay home from school or work until you have stopped having diarrhea. Do not cook food for others while you have diarrhea.  · If you were given any medicines, make sure to take them as you were told.  When do I need to get emergency help?   · Return to the ED if:   ? You have signs of severe fluid loss, such as:  § No urine for more than 8 hours.  § Feel very light-headed or like you are going to pass out.  § Feel weak like you are going to fall.  ? Your stools have a large amount (more than 1 teaspoon or 5 mL) of blood in them.  ? You have very bad belly pain.  When do I need to call the doctor?   · You have more than 6 runny, watery stools in 24 hours.  · You have a fever of 101.3°F (38.5°C) or higher or chills that do not go away after a day.  · You have very bad belly pain.  · Your stools have a small amount (less than 1 teaspoon or 5 mL) of blood in them.  · You develop early signs of fluid loss, such as:  ? Your urine is very dark colored.  ? Your mouth is dry.  ? You have muscle cramps.  ? You have a lack of energy.  ? You feel light-headed  when you get up.  · You have new or worsening symptoms.  Last Reviewed Date   2021-05-21  Consumer Information Use and Disclaimer   This information is not specific medical advice and does not replace information you receive from your health care provider. This is only a brief summary of general information. It does NOT include all information about conditions, illnesses, injuries, tests, procedures, treatments, therapies, discharge instructions or life-style choices that may apply to you. You must talk with your health care provider for complete information about your health and treatment options. This information should not be used to decide whether or not to accept your health care providers advice, instructions or recommendations. Only your health care provider has the knowledge and training to provide advice that is right for you.  Copyright   Copyright © 2021 [employer] and its affiliates and/or licensors. All rights reserved.  Patient Education       Viral Syndrome Discharge Instructions   About this topic   Viral syndrome is an illness with signs like you would get with a cold or the flu. A tiny germ called a virus causes this infection. Most people get better in 1 to 2 weeks without treatment. This illness spreads easily from person to person.     What care is needed at home?   · Ask your doctor what you need to do when you go home. Make sure you ask questions if you do not understand what the doctor says. This way you will know what you need to do.  · Drink lots of water, juice, or broth to replace fluids lost in runny nose and fever. Suck on ice chips or popsicles to ease throat pain.  · Get lots of rest and sleep. Sleep helps your body get back the energy it needs.  · Stay away from others until you are feeling better.  What follow-up care is needed?   Your doctor may ask you to make visits to the office to check on your progress. Be sure to keep these visits.  What drugs may be needed?   The doctor  may order drugs to:  · Help with pain  · Lower fever  · Help with pain from a sore throat  · Help a runny or stuffy nose  · Ease or stop coughing  Will physical activity be limited?   You need to rest while you are getting better. This means you may need to limit your activity until you feel well.  What changes to diet are needed?   Eat foods that will not upset your stomach like chicken soup, bananas, rice, apples, or toast.  What problems could happen?   · Lung problems like pneumonia and bronchitis  · Too much fluid loss  · Infection  What can be done to prevent this health problem?   · If you are sick, cover your mouth and nose with tissue when you cough or sneeze. You can also cough into your elbow. Throw away tissues in the trash and wash your hands after touching used tissues.  · Wash your hands often with soap and water for at least 20 seconds, especially after coughing or sneezing. Alcohol-based hand sanitizers also work to kill the virus.  · Do not get too close (kissing, hugging) to people who are sick.  · Stay away from crowded places.  · Do not share towels or hankies with anyone who is sick. Clean commonly handled things like door handles, remotes, toys, and phones. Wipe them with a disinfectant.  · Take vitamin C to help build up your body's ability to fight disease.  · Get a flu shot each year.  When do I need to call the doctor?   · Signs of infection. These include a fever of 100.4°F (38°C) or higher, chills, very bad sore throat, ear or sinus pain, cough, more sputum or change in color of sputum, and mouth sores.  · Trouble breathing  · Very bad throwing up or throwing up that does not stop  · Health problem is not better or you are feeling worse  Teach Back: Helping You Understand   The Teach Back Method helps you understand the information we are giving you. After you talk with the staff, tell them in your own words what you learned. This helps to make sure the staff has described each thing  clearly. It also helps to explain things that may have been confusing. Before going home, make sure you can do these:  · I can tell you about my condition.  · I can tell you what may help ease my sore throat.  · I can tell you what I can do to help avoid passing the infection to others.  · I can tell you what I will do if I have trouble breathing, very bad throwing up, or throwing up that does not stop.  Last Reviewed Date   2020-08-24  Consumer Information Use and Disclaimer   This information is not specific medical advice and does not replace information you receive from your health care provider. This is only a brief summary of general information. It does NOT include all information about conditions, illnesses, injuries, tests, procedures, treatments, therapies, discharge instructions or life-style choices that may apply to you. You must talk with your health care provider for complete information about your health and treatment options. This information should not be used to decide whether or not to accept your health care providers advice, instructions or recommendations. Only your health care provider has the knowledge and training to provide advice that is right for you.  Copyright   Copyright © 2021 [employer] and its affiliates and/or licensors. All rights reserved.

## 2022-01-27 NOTE — PATIENT INSTRUCTIONS
Patient Education       Diarrhea, Adult ED   General Information   You came to the Emergency Department (ED) for diarrhea. Most doctors say you have diarrhea if you have 3 or more runny or watery stools or bowel movements in a day. The doctors feel that the risk of a serious cause for your diarrhea is low.  Diarrhea that starts all of a sudden is most often caused by a virus or bacteria. This will likely get better on its own after a few days. Other things can cause you to have loose stools like:  · Side effects from the medicines you take.  · Problems digesting your food.  · Problems with your digestive system.  You may be waiting on some test results. The staff will contact you if there are concerning results.  What care is needed at home?   · Call your regular doctor to let them know you were in the ED. Make a follow-up appointment if you were told to.  · Drink small amounts of fluid every 15 to 30 minutes. Good fluids to drink are water, broth, and oral electrolyte solutions. Sugar-free or very low sugar sports drinks are also OK.  · Try to eat a small amount of food. Good foods to eat are potatoes, noodles, rice, oatmeal, crackers, soup, soft vegetables, and bananas. Avoid fatty foods and dairy products.  · Wash your hands often. This will help keep others healthy. It is easy to spread diarrhea from one person to the next.  · Stay home from school or work until you have stopped having diarrhea. Do not cook food for others while you have diarrhea.  · If you were given any medicines, make sure to take them as you were told.  When do I need to get emergency help?   · Return to the ED if:   ? You have signs of severe fluid loss, such as:  § No urine for more than 8 hours.  § Feel very light-headed or like you are going to pass out.  § Feel weak like you are going to fall.  ? Your stools have a large amount (more than 1 teaspoon or 5 mL) of blood in them.  ? You have very bad belly pain.  When do I need to call the  doctor?   · You have more than 6 runny, watery stools in 24 hours.  · You have a fever of 101.3°F (38.5°C) or higher or chills that do not go away after a day.  · You have very bad belly pain.  · Your stools have a small amount (less than 1 teaspoon or 5 mL) of blood in them.  · You develop early signs of fluid loss, such as:  ? Your urine is very dark colored.  ? Your mouth is dry.  ? You have muscle cramps.  ? You have a lack of energy.  ? You feel light-headed when you get up.  · You have new or worsening symptoms.  Last Reviewed Date   2021-05-21  Consumer Information Use and Disclaimer   This information is not specific medical advice and does not replace information you receive from your health care provider. This is only a brief summary of general information. It does NOT include all information about conditions, illnesses, injuries, tests, procedures, treatments, therapies, discharge instructions or life-style choices that may apply to you. You must talk with your health care provider for complete information about your health and treatment options. This information should not be used to decide whether or not to accept your health care providers advice, instructions or recommendations. Only your health care provider has the knowledge and training to provide advice that is right for you.  Copyright   Copyright © 2021 [employer] and its affiliates and/or licensors. All rights reserved.  Patient Education       Viral Syndrome Discharge Instructions   About this topic   Viral syndrome is an illness with signs like you would get with a cold or the flu. A tiny germ called a virus causes this infection. Most people get better in 1 to 2 weeks without treatment. This illness spreads easily from person to person.     What care is needed at home?   · Ask your doctor what you need to do when you go home. Make sure you ask questions if you do not understand what the doctor says. This way you will know what you need  to do.  · Drink lots of water, juice, or broth to replace fluids lost in runny nose and fever. Suck on ice chips or popsicles to ease throat pain.  · Get lots of rest and sleep. Sleep helps your body get back the energy it needs.  · Stay away from others until you are feeling better.  What follow-up care is needed?   Your doctor may ask you to make visits to the office to check on your progress. Be sure to keep these visits.  What drugs may be needed?   The doctor may order drugs to:  · Help with pain  · Lower fever  · Help with pain from a sore throat  · Help a runny or stuffy nose  · Ease or stop coughing  Will physical activity be limited?   You need to rest while you are getting better. This means you may need to limit your activity until you feel well.  What changes to diet are needed?   Eat foods that will not upset your stomach like chicken soup, bananas, rice, apples, or toast.  What problems could happen?   · Lung problems like pneumonia and bronchitis  · Too much fluid loss  · Infection  What can be done to prevent this health problem?   · If you are sick, cover your mouth and nose with tissue when you cough or sneeze. You can also cough into your elbow. Throw away tissues in the trash and wash your hands after touching used tissues.  · Wash your hands often with soap and water for at least 20 seconds, especially after coughing or sneezing. Alcohol-based hand sanitizers also work to kill the virus.  · Do not get too close (kissing, hugging) to people who are sick.  · Stay away from crowded places.  · Do not share towels or hankies with anyone who is sick. Clean commonly handled things like door handles, remotes, toys, and phones. Wipe them with a disinfectant.  · Take vitamin C to help build up your body's ability to fight disease.  · Get a flu shot each year.  When do I need to call the doctor?   · Signs of infection. These include a fever of 100.4°F (38°C) or higher, chills, very bad sore throat, ear or  sinus pain, cough, more sputum or change in color of sputum, and mouth sores.  · Trouble breathing  · Very bad throwing up or throwing up that does not stop  · Health problem is not better or you are feeling worse  Teach Back: Helping You Understand   The Teach Back Method helps you understand the information we are giving you. After you talk with the staff, tell them in your own words what you learned. This helps to make sure the staff has described each thing clearly. It also helps to explain things that may have been confusing. Before going home, make sure you can do these:  · I can tell you about my condition.  · I can tell you what may help ease my sore throat.  · I can tell you what I can do to help avoid passing the infection to others.  · I can tell you what I will do if I have trouble breathing, very bad throwing up, or throwing up that does not stop.  Last Reviewed Date   2020-08-24  Consumer Information Use and Disclaimer   This information is not specific medical advice and does not replace information you receive from your health care provider. This is only a brief summary of general information. It does NOT include all information about conditions, illnesses, injuries, tests, procedures, treatments, therapies, discharge instructions or life-style choices that may apply to you. You must talk with your health care provider for complete information about your health and treatment options. This information should not be used to decide whether or not to accept your health care providers advice, instructions or recommendations. Only your health care provider has the knowledge and training to provide advice that is right for you.  Copyright   Copyright © 2021 [employer] and its affiliates and/or licensors. All rights reserved.

## 2022-01-27 NOTE — LETTER
January 27, 2022      [facility]  [address] [address]  Phone: [phone]  Fax: [phone]       Patient: Susan Allison   YOB: 1985  Date of Visit: 01/27/2022    To Whom It May Concern:    Omayra Allison  was at [facility] on 01/27/2022. The patient may return to work/school on jan 28,2022 w/ no restrictions. If you have any questions or concerns, or if I can be of further assistance, please do not hesitate to contact me.    Sincerely,    CHERELLE Schulz

## 2022-02-03 ENCOUNTER — OFFICE VISIT (OUTPATIENT)
Dept: URGENT CARE | Facility: CLINIC | Age: 37
End: 2022-02-03
Payer: COMMERCIAL

## 2022-02-03 VITALS
HEART RATE: 73 BPM | WEIGHT: 190 LBS | HEIGHT: 73 IN | BODY MASS INDEX: 25.18 KG/M2 | TEMPERATURE: 99 F | SYSTOLIC BLOOD PRESSURE: 131 MMHG | OXYGEN SATURATION: 96 % | DIASTOLIC BLOOD PRESSURE: 79 MMHG | RESPIRATION RATE: 19 BRPM

## 2022-02-03 DIAGNOSIS — J34.89 STUFFY AND RUNNY NOSE: Primary | ICD-10-CM

## 2022-02-03 LAB
CTP QC/QA: YES
SARS-COV-2 RDRP RESP QL NAA+PROBE: NEGATIVE

## 2022-02-03 PROCEDURE — 3075F PR MOST RECENT SYSTOLIC BLOOD PRESS GE 130-139MM HG: ICD-10-PCS | Mod: CPTII,S$GLB,, | Performed by: FAMILY MEDICINE

## 2022-02-03 PROCEDURE — 3078F DIAST BP <80 MM HG: CPT | Mod: CPTII,S$GLB,, | Performed by: FAMILY MEDICINE

## 2022-02-03 PROCEDURE — 3008F PR BODY MASS INDEX (BMI) DOCUMENTED: ICD-10-PCS | Mod: CPTII,S$GLB,, | Performed by: FAMILY MEDICINE

## 2022-02-03 PROCEDURE — 1160F PR REVIEW ALL MEDS BY PRESCRIBER/CLIN PHARMACIST DOCUMENTED: ICD-10-PCS | Mod: CPTII,S$GLB,, | Performed by: FAMILY MEDICINE

## 2022-02-03 PROCEDURE — 1159F PR MEDICATION LIST DOCUMENTED IN MEDICAL RECORD: ICD-10-PCS | Mod: CPTII,S$GLB,, | Performed by: FAMILY MEDICINE

## 2022-02-03 PROCEDURE — 99213 OFFICE O/P EST LOW 20 MIN: CPT | Mod: S$GLB,,, | Performed by: FAMILY MEDICINE

## 2022-02-03 PROCEDURE — 3078F PR MOST RECENT DIASTOLIC BLOOD PRESSURE < 80 MM HG: ICD-10-PCS | Mod: CPTII,S$GLB,, | Performed by: FAMILY MEDICINE

## 2022-02-03 PROCEDURE — 99213 PR OFFICE/OUTPT VISIT, EST, LEVL III, 20-29 MIN: ICD-10-PCS | Mod: S$GLB,,, | Performed by: FAMILY MEDICINE

## 2022-02-03 PROCEDURE — U0002: ICD-10-PCS | Mod: QW,S$GLB,, | Performed by: FAMILY MEDICINE

## 2022-02-03 PROCEDURE — U0002 COVID-19 LAB TEST NON-CDC: HCPCS | Mod: QW,S$GLB,, | Performed by: FAMILY MEDICINE

## 2022-02-03 PROCEDURE — 1159F MED LIST DOCD IN RCRD: CPT | Mod: CPTII,S$GLB,, | Performed by: FAMILY MEDICINE

## 2022-02-03 PROCEDURE — 1160F RVW MEDS BY RX/DR IN RCRD: CPT | Mod: CPTII,S$GLB,, | Performed by: FAMILY MEDICINE

## 2022-02-03 PROCEDURE — 3008F BODY MASS INDEX DOCD: CPT | Mod: CPTII,S$GLB,, | Performed by: FAMILY MEDICINE

## 2022-02-03 PROCEDURE — 3075F SYST BP GE 130 - 139MM HG: CPT | Mod: CPTII,S$GLB,, | Performed by: FAMILY MEDICINE

## 2022-02-03 RX ORDER — LORATADINE 10 MG/1
10 TABLET ORAL DAILY
Qty: 30 TABLET | Refills: 2 | Status: SHIPPED | OUTPATIENT
Start: 2022-02-03 | End: 2022-12-05

## 2022-02-03 NOTE — PROGRESS NOTES
"Subjective:       Patient ID: Susan Allison is a 36 y.o. male.    Vitals:  height is 6' 1" (1.854 m) and weight is 86.2 kg (190 lb). His temperature is 98.9 °F (37.2 °C). His pulse is 73. His respiration is 19 and oxygen saturation is 96%.     Chief Complaint: Fatigue    Pt presents fatigue, runny nose and a dry cough since today    Fatigue  This is a new problem. The current episode started today. The problem occurs constantly. The problem has been unchanged. Associated symptoms include coughing and fatigue. Nothing aggravates the symptoms. He has tried nothing for the symptoms. The treatment provided no relief.       Constitution: Positive for fatigue.   Respiratory: Positive for cough.        Objective:      Physical Exam   Constitutional: He is oriented to person, place, and time. He appears well-developed and well-nourished. He is cooperative.  Non-toxic appearance. He does not appear ill. No distress.   HENT:   Head: Normocephalic and atraumatic.   Ears:   Right Ear: Hearing, tympanic membrane, external ear and ear canal normal.   Left Ear: Hearing, tympanic membrane, external ear and ear canal normal.   Nose: Nose normal. No mucosal edema, rhinorrhea or nasal deformity. No epistaxis. Right sinus exhibits no maxillary sinus tenderness and no frontal sinus tenderness. Left sinus exhibits no maxillary sinus tenderness and no frontal sinus tenderness.   Mouth/Throat: Uvula is midline, oropharynx is clear and moist and mucous membranes are normal. Mucous membranes are moist. No trismus in the jaw. Normal dentition. No uvula swelling. No posterior oropharyngeal erythema. Oropharynx is clear.   Eyes: Conjunctivae and lids are normal. Pupils are equal, round, and reactive to light. Right eye exhibits no discharge. Left eye exhibits no discharge. No scleral icterus.      extraocular movement intact   Neck: Trachea normal and phonation normal. Neck supple.   Cardiovascular: Normal rate, regular rhythm, normal heart " sounds, intact distal pulses and normal pulses.   Pulmonary/Chest: Effort normal and breath sounds normal. No respiratory distress.   Abdominal: Normal appearance and bowel sounds are normal. He exhibits no distension, no pulsatile midline mass and no mass. Soft. There is no abdominal tenderness.   Musculoskeletal: Normal range of motion.         General: No deformity or edema. Normal range of motion.   Neurological: He is alert and oriented to person, place, and time. He exhibits normal muscle tone. Coordination normal.   Skin: Skin is warm, dry, intact, not diaphoretic and not pale.   Psychiatric: He has a normal mood and affect. His speech is normal and behavior is normal. Judgment and thought content normal. Cognition and memory  Nursing note and vitals reviewed.        Assessment:       1. Stuffy and runny nose          Plan:         Stuffy and runny nose  -     POCT COVID-19 Rapid Screening          Results for orders placed or performed in visit on 01/27/22   POCT COVID-19 Rapid Screening   Result Value Ref Range    POC Rapid COVID Negative Negative     Acceptable Yes            COVID NEGATIVE    Reassurance      Claritin daily        Results for orders placed or performed in visit on 02/03/22   POCT COVID-19 Rapid Screening   Result Value Ref Range    POC Rapid COVID Negative Negative     Acceptable Yes

## 2022-05-31 ENCOUNTER — PATIENT MESSAGE (OUTPATIENT)
Dept: ADMINISTRATIVE | Facility: HOSPITAL | Age: 37
End: 2022-05-31
Payer: COMMERCIAL

## 2022-12-05 ENCOUNTER — OFFICE VISIT (OUTPATIENT)
Dept: PRIMARY CARE CLINIC | Facility: CLINIC | Age: 37
End: 2022-12-05
Payer: COMMERCIAL

## 2022-12-05 VITALS
OXYGEN SATURATION: 97 % | HEART RATE: 66 BPM | DIASTOLIC BLOOD PRESSURE: 78 MMHG | BODY MASS INDEX: 28.46 KG/M2 | HEIGHT: 73 IN | TEMPERATURE: 97 F | RESPIRATION RATE: 18 BRPM | WEIGHT: 214.75 LBS | SYSTOLIC BLOOD PRESSURE: 110 MMHG

## 2022-12-05 DIAGNOSIS — Z13.6 ENCOUNTER FOR SCREENING FOR CARDIOVASCULAR DISORDERS: Primary | ICD-10-CM

## 2022-12-05 DIAGNOSIS — Z11.59 NEED FOR HEPATITIS C SCREENING TEST: ICD-10-CM

## 2022-12-05 DIAGNOSIS — R73.03 PREDIABETES: ICD-10-CM

## 2022-12-05 PROCEDURE — 3074F PR MOST RECENT SYSTOLIC BLOOD PRESSURE < 130 MM HG: ICD-10-PCS | Mod: CPTII,S$GLB,, | Performed by: STUDENT IN AN ORGANIZED HEALTH CARE EDUCATION/TRAINING PROGRAM

## 2022-12-05 PROCEDURE — 3008F PR BODY MASS INDEX (BMI) DOCUMENTED: ICD-10-PCS | Mod: CPTII,S$GLB,, | Performed by: STUDENT IN AN ORGANIZED HEALTH CARE EDUCATION/TRAINING PROGRAM

## 2022-12-05 PROCEDURE — 3074F SYST BP LT 130 MM HG: CPT | Mod: CPTII,S$GLB,, | Performed by: STUDENT IN AN ORGANIZED HEALTH CARE EDUCATION/TRAINING PROGRAM

## 2022-12-05 PROCEDURE — 1159F MED LIST DOCD IN RCRD: CPT | Mod: CPTII,S$GLB,, | Performed by: STUDENT IN AN ORGANIZED HEALTH CARE EDUCATION/TRAINING PROGRAM

## 2022-12-05 PROCEDURE — 99999 PR PBB SHADOW E&M-EST. PATIENT-LVL III: ICD-10-PCS | Mod: PBBFAC,,, | Performed by: STUDENT IN AN ORGANIZED HEALTH CARE EDUCATION/TRAINING PROGRAM

## 2022-12-05 PROCEDURE — 1159F PR MEDICATION LIST DOCUMENTED IN MEDICAL RECORD: ICD-10-PCS | Mod: CPTII,S$GLB,, | Performed by: STUDENT IN AN ORGANIZED HEALTH CARE EDUCATION/TRAINING PROGRAM

## 2022-12-05 PROCEDURE — 3008F BODY MASS INDEX DOCD: CPT | Mod: CPTII,S$GLB,, | Performed by: STUDENT IN AN ORGANIZED HEALTH CARE EDUCATION/TRAINING PROGRAM

## 2022-12-05 PROCEDURE — 99999 PR PBB SHADOW E&M-EST. PATIENT-LVL III: CPT | Mod: PBBFAC,,, | Performed by: STUDENT IN AN ORGANIZED HEALTH CARE EDUCATION/TRAINING PROGRAM

## 2022-12-05 PROCEDURE — 99395 PR PREVENTIVE VISIT,EST,18-39: ICD-10-PCS | Mod: S$GLB,,, | Performed by: STUDENT IN AN ORGANIZED HEALTH CARE EDUCATION/TRAINING PROGRAM

## 2022-12-05 PROCEDURE — 1160F RVW MEDS BY RX/DR IN RCRD: CPT | Mod: CPTII,S$GLB,, | Performed by: STUDENT IN AN ORGANIZED HEALTH CARE EDUCATION/TRAINING PROGRAM

## 2022-12-05 PROCEDURE — 99395 PREV VISIT EST AGE 18-39: CPT | Mod: S$GLB,,, | Performed by: STUDENT IN AN ORGANIZED HEALTH CARE EDUCATION/TRAINING PROGRAM

## 2022-12-05 PROCEDURE — 1160F PR REVIEW ALL MEDS BY PRESCRIBER/CLIN PHARMACIST DOCUMENTED: ICD-10-PCS | Mod: CPTII,S$GLB,, | Performed by: STUDENT IN AN ORGANIZED HEALTH CARE EDUCATION/TRAINING PROGRAM

## 2022-12-05 PROCEDURE — 3078F DIAST BP <80 MM HG: CPT | Mod: CPTII,S$GLB,, | Performed by: STUDENT IN AN ORGANIZED HEALTH CARE EDUCATION/TRAINING PROGRAM

## 2022-12-05 PROCEDURE — 3078F PR MOST RECENT DIASTOLIC BLOOD PRESSURE < 80 MM HG: ICD-10-PCS | Mod: CPTII,S$GLB,, | Performed by: STUDENT IN AN ORGANIZED HEALTH CARE EDUCATION/TRAINING PROGRAM

## 2022-12-05 NOTE — PROGRESS NOTES
"Subjective:       Patient ID: Susan Allison is a 37 y.o. male.    Chief Complaint: Annual Exam      HPI:  37 y.o. male presents to [facility] to establish care and for annual exam    Acute concerns?: Patient reports wife set appointment for work physical. Uncertain if should have been through [facility].    Last PCP?: Dr. Tramaine Mak  Allergies: NKDA  Medical History: Prediabetes, HLD  Medications:  none  Surgical History: None  Family History: Mother with breast cancer; no known autoimmune disease  Social History: Never smoker, very rare EtOH, no illicits    Has labs scheduled through [facility]. Will receive kit at home.    Review of Systems   Constitutional:  Negative for chills, diaphoresis, fatigue and fever.   HENT:  Negative for congestion, sinus pressure, sneezing and sore throat.    Respiratory:  Negative for cough and shortness of breath.    Cardiovascular:  Negative for chest pain and palpitations.   Gastrointestinal:  Negative for abdominal pain, diarrhea, nausea and vomiting.   Musculoskeletal:  Negative for arthralgias, joint swelling and myalgias.   Skin:  Negative for rash and wound.   Neurological:  Negative for dizziness and weakness.     Objective:      Vitals:    12/05/22 1545   BP: 110/78   BP Location: Left arm   Patient Position: Sitting   BP Method: Medium (Manual)   Pulse: 66   Resp: 18   Temp: 96.9 °F (36.1 °C)   TempSrc: Temporal   SpO2: 97%   Weight: 97.4 kg (214 lb 11.7 oz)   Height: 6' 1" (1.854 m)     Physical Exam  Vitals reviewed.   Constitutional:       General: He is not in acute distress.     Appearance: Normal appearance. He is not ill-appearing.   HENT:      Head: Normocephalic and atraumatic.      Mouth/Throat:      Mouth: Mucous membranes are moist.      Pharynx: Oropharynx is clear.   Eyes:      General:         Right eye: No discharge.         Left eye: No discharge.   Cardiovascular:      Rate and Rhythm: Normal rate and regular rhythm.      Pulses: Normal pulses.      " Heart sounds: No murmur heard.  Pulmonary:      Effort: Pulmonary effort is normal.      Breath sounds: Normal breath sounds.   Abdominal:      Palpations: Abdomen is soft.      Tenderness: There is no abdominal tenderness.   Musculoskeletal:         General: No deformity.   Skin:     General: Skin is warm and dry.      Coloration: Skin is not jaundiced.   Neurological:      General: No focal deficit present.      Mental Status: He is alert and oriented to person, place, and time.   Psychiatric:         Mood and Affect: Mood normal.         Behavior: Behavior normal.           Lab Results   Component Value Date     02/14/2020    K 3.8 02/14/2020     02/14/2020    CO2 25 02/14/2020    BUN 12 02/14/2020    CREATININE 1.0 02/14/2020    ANIONGAP 10 02/14/2020     Lab Results   Component Value Date    HGBA1C 6.0 (H) 02/14/2020     No results found for: BNP, BNPTRIAGEBLO    Lab Results   Component Value Date    WBC 5.92 02/14/2020    HGB 14.5 02/14/2020    HCT 44.6 02/14/2020     (L) 02/14/2020    GRAN 3.0 02/14/2020    GRAN 50.5 02/14/2020     Lab Results   Component Value Date    CHOL 216 (H) 02/14/2020    HDL 37 (L) 02/14/2020    LDLCALC 138.4 02/14/2020    TRIG 203 (H) 02/14/2020        No current outpatient medications on file.        Assessment:       1. Encounter for screening for cardiovascular disorders    2. Prediabetes    3. Need for hepatitis C screening test           Plan:       Encounter for screening for cardiovascular disorders  Prediabetes  -     Hemoglobin A1C; Future; Expected date: 12/05/2022  - Will receive biometric testing through [facility]. If glucose fasting is not included will present to lab for repeat draw  - Can interpret labs if needed from outside source  - Recommend regular exercise 4-5 days per week 30 minutes per day to help improve cholesterol numbers. Decreased dietary fats and daily fish oil may also help improve this finding.    Need for hepatitis C screening  test  -     Hepatitis C Antibody; Future; Expected date: 12/05/2022     RTC in 1 year